# Patient Record
Sex: MALE | Race: WHITE | NOT HISPANIC OR LATINO | URBAN - NONMETROPOLITAN AREA
[De-identification: names, ages, dates, MRNs, and addresses within clinical notes are randomized per-mention and may not be internally consistent; named-entity substitution may affect disease eponyms.]

---

## 2021-09-15 ENCOUNTER — LAB OUTSIDE AN ENCOUNTER (OUTPATIENT)
Dept: URBAN - NONMETROPOLITAN AREA CLINIC 2 | Facility: CLINIC | Age: 71
End: 2021-09-15

## 2021-09-15 ENCOUNTER — WEB ENCOUNTER (OUTPATIENT)
Dept: URBAN - NONMETROPOLITAN AREA CLINIC 2 | Facility: CLINIC | Age: 71
End: 2021-09-15

## 2021-09-15 ENCOUNTER — OFFICE VISIT (OUTPATIENT)
Dept: URBAN - NONMETROPOLITAN AREA CLINIC 2 | Facility: CLINIC | Age: 71
End: 2021-09-15
Payer: MEDICARE

## 2021-09-15 VITALS
WEIGHT: 176 LBS | SYSTOLIC BLOOD PRESSURE: 105 MMHG | HEART RATE: 58 BPM | DIASTOLIC BLOOD PRESSURE: 72 MMHG | TEMPERATURE: 97.7 F | HEIGHT: 69 IN | BODY MASS INDEX: 26.07 KG/M2

## 2021-09-15 DIAGNOSIS — D50.0 IRON DEFICIENCY ANEMIA DUE TO CHRONIC BLOOD LOSS: ICD-10-CM

## 2021-09-15 DIAGNOSIS — Z12.11 COLON CANCER SCREENING: ICD-10-CM

## 2021-09-15 DIAGNOSIS — I48.91 ATRIAL FIBRILLATION, UNSPECIFIED TYPE: ICD-10-CM

## 2021-09-15 DIAGNOSIS — K62.5 RECTAL BLEEDING: ICD-10-CM

## 2021-09-15 PROBLEM — 305058001: Status: ACTIVE | Noted: 2021-09-15

## 2021-09-15 PROBLEM — 49436004: Status: ACTIVE | Noted: 2021-09-15

## 2021-09-15 PROCEDURE — 99204 OFFICE O/P NEW MOD 45 MIN: CPT | Performed by: INTERNAL MEDICINE

## 2021-09-15 RX ORDER — METOPROLOL SUCCINATE AND HYDROCHLOROTHIAZIDE 25; 12.5 MG/1; MG/1
1 TABLET TABLET ORAL ONCE A DAY
Status: ACTIVE | COMMUNITY

## 2021-09-15 RX ORDER — FINASTERIDE 5 MG/1
1 TABLET TABLET, FILM COATED ORAL ONCE A DAY
Status: ACTIVE | COMMUNITY

## 2021-09-15 RX ORDER — TAMSULOSIN HYDROCHLORIDE 0.4 MG/1
1 CAPSULE CAPSULE ORAL ONCE A DAY
Status: ACTIVE | COMMUNITY

## 2021-09-15 RX ORDER — APIXABAN 5 MG/1
AS DIRECTED TABLET, FILM COATED ORAL
Status: ACTIVE | COMMUNITY

## 2021-09-15 RX ORDER — PANTOPRAZOLE SODIUM 40 MG/1
1 TABLET TABLET, DELAYED RELEASE ORAL ONCE A DAY
Status: ACTIVE | COMMUNITY

## 2021-09-15 RX ORDER — OLMESARTAN MEDOXOMIL 40 MG/1
1 TABLET TABLET ORAL ONCE A DAY
Status: ACTIVE | COMMUNITY

## 2021-09-15 RX ORDER — LEVOTHYROXINE SODIUM 0.05 MG/1
1 TABLET IN THE MORNING ON AN EMPTY STOMACH TABLET ORAL ONCE A DAY
Status: ACTIVE | COMMUNITY

## 2021-09-15 NOTE — HPI-TODAY'S VISIT:
Mr. Levi is a 71-year-old male referred to us by Dr. Luther for consultation of GI bleeding.  A copy of this note will be sent to the referring physician.  He states over the past month he has noticed bright red blood per rectum when wiping.  He has a history of hemorrhoids.  He is on Eliquis with a history of A. fib, he is not been to a cardiologist in over 4 years.  This past week he has been having more soft sticky black stools along with bright red blood per rectum.  He has never had an EGD.  He denies any NSAID use.  His last colonoscopy was done 4 to 5 years ago in [place] by gastroenterologist there.  He states this was normal.  He did have blood work drawn Monday, we do not have a copy of these results today.  He does think that he was told he was anemic.  He denies any dizziness or shortness of breath.  Dr. Luther did start pantoprazole 40 mg daily.  He started this yesterday.  Today he is doing fairly well, he is only had 1 soft dark bowel movement today.  MB

## 2021-09-17 PROBLEM — 724556004: Status: ACTIVE | Noted: 2021-09-15

## 2021-09-17 PROBLEM — 12063002: Status: ACTIVE | Noted: 2021-09-15

## 2021-09-24 ENCOUNTER — OFFICE VISIT (OUTPATIENT)
Dept: URBAN - NONMETROPOLITAN AREA SURGERY CENTER 1 | Facility: SURGERY CENTER | Age: 71
End: 2021-09-24

## 2021-09-28 ENCOUNTER — TELEPHONE ENCOUNTER (OUTPATIENT)
Dept: URBAN - NONMETROPOLITAN AREA CLINIC 2 | Facility: CLINIC | Age: 71
End: 2021-09-28

## 2021-09-28 ENCOUNTER — OFFICE VISIT (OUTPATIENT)
Dept: URBAN - METROPOLITAN AREA MEDICAL CENTER 1 | Facility: MEDICAL CENTER | Age: 71
End: 2021-09-28
Payer: MEDICARE

## 2021-09-28 DIAGNOSIS — K31.89 ACQUIRED DEFORMITY OF DUODENUM: ICD-10-CM

## 2021-09-28 DIAGNOSIS — D12.0 ADENOMA OF CECUM: ICD-10-CM

## 2021-09-28 DIAGNOSIS — K92.1 ACUTE MELENA: ICD-10-CM

## 2021-09-28 DIAGNOSIS — D12.2 ADENOMA OF ASCENDING COLON: ICD-10-CM

## 2021-09-28 DIAGNOSIS — K31.7 BENIGN GASTRIC POLYP: ICD-10-CM

## 2021-09-28 DIAGNOSIS — K20.80 ESOPHAGITIS DISSECANS SUPERFICIALIS: ICD-10-CM

## 2021-09-28 DIAGNOSIS — D12.3 ADENOMA OF TRANSVERSE COLON: ICD-10-CM

## 2021-09-28 DIAGNOSIS — D12.8 ADENOMATOUS POLYP OF RECTUM: ICD-10-CM

## 2021-09-28 DIAGNOSIS — K29.60 ADENOPAPILLOMATOSIS GASTRICA: ICD-10-CM

## 2021-09-28 PROCEDURE — 43239 EGD BIOPSY SINGLE/MULTIPLE: CPT | Performed by: INTERNAL MEDICINE

## 2021-09-28 PROCEDURE — 45380 COLONOSCOPY AND BIOPSY: CPT | Performed by: INTERNAL MEDICINE

## 2021-09-28 PROCEDURE — 45385 COLONOSCOPY W/LESION REMOVAL: CPT | Performed by: INTERNAL MEDICINE

## 2021-09-28 RX ORDER — APIXABAN 5 MG/1
AS DIRECTED TABLET, FILM COATED ORAL
Status: ACTIVE | COMMUNITY

## 2021-09-28 RX ORDER — PANTOPRAZOLE SODIUM 40 MG/1
1 TABLET TABLET, DELAYED RELEASE ORAL ONCE A DAY
Status: ACTIVE | COMMUNITY

## 2021-09-28 RX ORDER — FINASTERIDE 5 MG/1
1 TABLET TABLET, FILM COATED ORAL ONCE A DAY
Status: ACTIVE | COMMUNITY

## 2021-09-28 RX ORDER — TAMSULOSIN HYDROCHLORIDE 0.4 MG/1
1 CAPSULE CAPSULE ORAL ONCE A DAY
Status: ACTIVE | COMMUNITY

## 2021-09-28 RX ORDER — OLMESARTAN MEDOXOMIL 40 MG/1
1 TABLET TABLET ORAL ONCE A DAY
Status: ACTIVE | COMMUNITY

## 2021-09-28 RX ORDER — METOPROLOL SUCCINATE AND HYDROCHLOROTHIAZIDE 25; 12.5 MG/1; MG/1
1 TABLET TABLET ORAL ONCE A DAY
Status: ACTIVE | COMMUNITY

## 2021-09-28 RX ORDER — LEVOTHYROXINE SODIUM 0.05 MG/1
1 TABLET IN THE MORNING ON AN EMPTY STOMACH TABLET ORAL ONCE A DAY
Status: ACTIVE | COMMUNITY

## 2021-11-09 ENCOUNTER — OFFICE VISIT (OUTPATIENT)
Dept: URBAN - NONMETROPOLITAN AREA CLINIC 2 | Facility: CLINIC | Age: 71
End: 2021-11-09

## 2021-12-03 ENCOUNTER — OFFICE VISIT (OUTPATIENT)
Dept: URBAN - NONMETROPOLITAN AREA CLINIC 2 | Facility: CLINIC | Age: 71
End: 2021-12-03
Payer: MEDICARE

## 2021-12-03 VITALS
SYSTOLIC BLOOD PRESSURE: 106 MMHG | HEART RATE: 51 BPM | WEIGHT: 174 LBS | HEIGHT: 69 IN | TEMPERATURE: 97 F | DIASTOLIC BLOOD PRESSURE: 74 MMHG | BODY MASS INDEX: 25.77 KG/M2

## 2021-12-03 DIAGNOSIS — K31.A0 INTESTINAL METAPLASIA OF STOMACH: ICD-10-CM

## 2021-12-03 DIAGNOSIS — Z79.02 LONG TERM (CURRENT) USE OF ANTITHROMBOTICS/ANTIPLATELETS: ICD-10-CM

## 2021-12-03 DIAGNOSIS — Z86.010 PERSONAL HISTORY OF COLONIC POLYPS: ICD-10-CM

## 2021-12-03 DIAGNOSIS — K20.90 ESOPHAGITIS: ICD-10-CM

## 2021-12-03 DIAGNOSIS — D50.8 ACQUIRED IRON DEFICIENCY ANEMIA DUE TO DECREASED ABSORPTION: ICD-10-CM

## 2021-12-03 PROCEDURE — 99214 OFFICE O/P EST MOD 30 MIN: CPT | Performed by: INTERNAL MEDICINE

## 2021-12-03 RX ORDER — TAMSULOSIN HYDROCHLORIDE 0.4 MG/1
1 CAPSULE CAPSULE ORAL ONCE A DAY
Status: ACTIVE | COMMUNITY

## 2021-12-03 RX ORDER — OLMESARTAN MEDOXOMIL 40 MG/1
1 TABLET TABLET ORAL ONCE A DAY
Status: ACTIVE | COMMUNITY

## 2021-12-03 RX ORDER — PANTOPRAZOLE SODIUM 20 MG/1
1 TABLET TABLET, DELAYED RELEASE ORAL ONCE A DAY
Qty: 90 | Refills: 3 | OUTPATIENT
Start: 2021-12-03

## 2021-12-03 RX ORDER — METOPROLOL SUCCINATE AND HYDROCHLOROTHIAZIDE 25; 12.5 MG/1; MG/1
1 TABLET TABLET ORAL ONCE A DAY
Status: ACTIVE | COMMUNITY

## 2021-12-03 RX ORDER — APIXABAN 5 MG/1
AS DIRECTED TABLET, FILM COATED ORAL
Status: ACTIVE | COMMUNITY

## 2021-12-03 RX ORDER — FINASTERIDE 5 MG/1
1 TABLET TABLET, FILM COATED ORAL ONCE A DAY
Status: ACTIVE | COMMUNITY

## 2021-12-03 RX ORDER — POLYETHYLENE GLYCOL 3350, SODIUM SULFATE, SODIUM CHLORIDE, POTASSIUM CHLORIDE, ASCORBIC ACID, SODIUM ASCORBATE 140-9-5.2G
AS DIRECTED KIT ORAL AS DIRECTED
Qty: 280 GRAM | Refills: 0 | OUTPATIENT
Start: 2021-12-03 | End: 2021-12-04

## 2021-12-03 RX ORDER — LEVOTHYROXINE SODIUM 0.05 MG/1
1 TABLET IN THE MORNING ON AN EMPTY STOMACH TABLET ORAL ONCE A DAY
Status: ACTIVE | COMMUNITY

## 2021-12-03 NOTE — HPI-TODAY'S VISIT:
9/15/2021: Initial Gastroenterology Clinic Visit   Mr. Levi is a 71-year-old male referred to us by Dr. Luther for consultation of GI bleeding.  A copy of this note will be sent to the referring physician.  He states over the past month he has noticed bright red blood per rectum when wiping.  He has a history of hemorrhoids.  He is on Eliquis with a history of A. fib, he is not been to a cardiologist in over 4 years.  This past week he has been having more soft sticky black stools along with bright red blood per rectum.  He has never had an EGD.  He denies any NSAID use.  His last colonoscopy was done 4 to 5 years ago in [place] by gastroenterologist there.  He states this was normal.  He did have blood work drawn Monday, we do not have a copy of these results today.  He does think that he was told he was anemic.  He denies any dizziness or shortness of breath.  Dr. Luther did start pantoprazole 40 mg daily.  He started this yesterday.  Today he is doing fairly well, he is only had 1 soft dark bowel movement today.  MB  9/28/2021: EGD Findings: - LA Grade A (one or more mucosal breaks less than 5 mm, not extending between tops of 2 mucosal folds) esophagitis with no bleeding was found. Biopsies were taken with a cold forceps for histology. Estimated blood loss was minimal. - The Z-line was regular and was found 35 cm from the incisors. - Erythematous mucosa without bleeding was found in the gastric body and in the gastric antrum. Biopsies were taken with a cold forceps for Helicobacter pylori testing. Estimated blood loss was minimal. - A single non-bleeding erosion was found in the pyloric channel. There were no stigmata of recent bleeding. Biopsies were taken with a cold forceps for histology. Estimated blood loss was minimal. - A few 2 to 4 mm sessile polyps with no stigmata of recent bleeding were found in the gastric body. Biopsies were taken with a cold forceps for histology of the largest polyp for sampling. Estimated blood loss was minimal. - The cardia and gastric fundus were normal on retroflexion. - The examined duodenum was normal. Biopsies for histology were taken with a cold forceps for evaluation of celiac disease. Estimated blood loss was minimal. 9/28/2021: Pathology from EGD A.   DUODENUM, BIOPSY:              ESSENTIALLY NORMAL DUODENAL VILLOUS ARCHITECTURE.              MILD CHRONIC NONSPECIFIC INFLAMMATION.                  NO CARCINOMA SEEN. B.  STOMACH, BIOPSY:              MILD TO MODERATE CHRONIC GASTRITIS AND PARTIAL GOBLET CELL METAPLASIA WITHOUT DYSPLASIA.              Helicobacter pylori stain negative.               NO CARCINOMA SEEN.   C.  STOMACH, DESIGNATED WHOLE BIOPSY:         CONSISTENT WITH HYPERPLASTIC GASTRIC POLYP.         NO CARCINOMA. D.  ESOPHAGUS BIOPSY:         MILD ACUTE AND CHRONIC ESOPHAGITIS.         NO INTESTINALIZATION, DYSPLASIA, OR CARCINOMA. 9/28/2021: Colonoscopy Findings: - The perianal and digital rectal examinations were normal. - The terminal ileum appeared normal. - Two sessile polyps were found in the cecum. The polyps were 2 to 3 mm in size. These polyps were removed with a cold biopsy forceps. Resection and retrieval were complete. Estimated blood loss was minimal. - Retroflexion in the right colon was performed and was normal. - Six sessile polyps were found in the ascending colon. The polyps were 3 to 6 mm in size. These polyps were removed with a cold snare. Resection and retrieval were complete. Estimated blood loss was minimal. - A 4 mm polyp was found in the transverse colon. The polyp was sessile. The polyp was removed with a cold snare. Resection and retrieval were complete. Estimated blood loss was minimal. - A few small and large-mouthed diverticula were found in the sigmoid colon. - A 3 mm polyp was found in the rectum. The polyp was sessile. The polyp was removed with a cold snare. Resection and retrieval were complete. Estimated blood loss was minimal. - A 12 mm polyp was found in the rectum. The polyp was pedunculated. The polyp was removed with a cold snare. Resection and retrieval were complete. Estimated blood loss was minimal. - Hemorrhoids were found during retroflexion. 9/28/2021: Pathology from Colonoscopy E.  COLON,   ASCENDING, BIOPSY:              TUBULAR ADENOMA (ADENOMATOUS POLYP).           NO HIGH GRADE DYSPLASIA OR CARCINOMA SEEN. F.  COLON,   CECUM, BIOPSY:              TUBULAR ADENOMA (ADENOMATOUS POLYP).           NO HIGH GRADE DYSPLASIA OR CARCINOMA SEEN. G.  COLON,   TRANSVERSE, BIOPSY:              TUBULAR ADENOMA (ADENOMATOUS POLYP).           NO HIGH GRADE DYSPLASIA OR CARCINOMA SEEN. H.  COLON,   RECTUM, BIOPSY:              TUBULAR ADENOMA (ADENOMATOUS POLYP).           NO HIGH GRADE DYSPLASIA OR CARCINOMA SEEN.  12/3/2021: Gastroenterology Follow-Up Visit  Mr. Levi has had a rare episode of bright red blood per rectum. He is not taking NSAIDs. He has not had melena or hematochezia. He is on apixaban. He is not taking his proton pump inhibitor. He is being evaluated by Cardiology for pacemaker placement. Denies abdominal pain or weight loss.

## 2021-12-05 LAB
A/G RATIO: 2.4
ALBUMIN: 4.5
ALKALINE PHOSPHATASE: 57
ALT (SGPT): 13
AST (SGOT): 18
BASO (ABSOLUTE): 0
BASOS: 1
BILIRUBIN, TOTAL: 0.4
BUN/CREATININE RATIO: 21
BUN: 21
CALCIUM: 9.5
CARBON DIOXIDE, TOTAL: 25
CHLORIDE: 101
CREATININE: 1
EGFR IF AFRICN AM: 87
EGFR IF NONAFRICN AM: 75
EOS (ABSOLUTE): 0.1
EOS: 3
GLOBULIN, TOTAL: 1.9
GLUCOSE: 90
H PYLORI, IGM ABS: <9
H. PYLORI, IGA ABS: <9
H. PYLORI, IGG ABS: 0.49
HEMATOCRIT: 41.4
HEMATOLOGY COMMENTS:: (no result)
HEMOGLOBIN: 13.2
IMMATURE CELLS: (no result)
IMMATURE GRANS (ABS): 0
IMMATURE GRANULOCYTES: 0
LYMPHS (ABSOLUTE): 1.3
LYMPHS: 32
MCH: 30.7
MCHC: 31.9
MCV: 96
MONOCYTES(ABSOLUTE): 0.4
MONOCYTES: 11
NEUTROPHILS (ABSOLUTE): 2.2
NEUTROPHILS: 53
NRBC: (no result)
PLATELETS: 195
POTASSIUM: 4.6
PROTEIN, TOTAL: 6.4
RBC: 4.3
RDW: 12
SODIUM: 140
WBC: 4

## 2022-07-11 ENCOUNTER — TELEPHONE ENCOUNTER (OUTPATIENT)
Dept: URBAN - NONMETROPOLITAN AREA CLINIC 2 | Facility: CLINIC | Age: 72
End: 2022-07-11

## 2022-08-23 ENCOUNTER — LAB OUTSIDE AN ENCOUNTER (OUTPATIENT)
Dept: URBAN - NONMETROPOLITAN AREA CLINIC 2 | Facility: CLINIC | Age: 72
End: 2022-08-23

## 2022-08-23 ENCOUNTER — OFFICE VISIT (OUTPATIENT)
Dept: URBAN - METROPOLITAN AREA MEDICAL CENTER 1 | Facility: MEDICAL CENTER | Age: 72
End: 2022-08-23
Payer: MEDICARE

## 2022-08-23 DIAGNOSIS — D12.2 ADENOMA OF ASCENDING COLON: ICD-10-CM

## 2022-08-23 DIAGNOSIS — D12.8 ADENOMATOUS POLYP OF RECTUM: ICD-10-CM

## 2022-08-23 DIAGNOSIS — D12.3 ADENOMA OF TRANSVERSE COLON: ICD-10-CM

## 2022-08-23 DIAGNOSIS — K29.30 CHRONIC SUPERFICIAL GASTRITIS: ICD-10-CM

## 2022-08-23 DIAGNOSIS — Z86.010 ADENOMAS PERSONAL HISTORY OF COLONIC POLYPS: ICD-10-CM

## 2022-08-23 DIAGNOSIS — K20.80 ESOPHAGITIS DISSECANS SUPERFICIALIS: ICD-10-CM

## 2022-08-23 PROCEDURE — 45385 COLONOSCOPY W/LESION REMOVAL: CPT | Performed by: INTERNAL MEDICINE

## 2022-08-23 PROCEDURE — 43239 EGD BIOPSY SINGLE/MULTIPLE: CPT | Performed by: INTERNAL MEDICINE

## 2022-08-23 RX ORDER — APIXABAN 5 MG/1
AS DIRECTED TABLET, FILM COATED ORAL
Status: ACTIVE | COMMUNITY

## 2022-08-23 RX ORDER — OLMESARTAN MEDOXOMIL 40 MG/1
1 TABLET TABLET ORAL ONCE A DAY
Status: ACTIVE | COMMUNITY

## 2022-08-23 RX ORDER — PANTOPRAZOLE SODIUM 20 MG/1
1 TABLET TABLET, DELAYED RELEASE ORAL ONCE A DAY
Qty: 90 | Refills: 3 | Status: ACTIVE | COMMUNITY
Start: 2021-12-03

## 2022-08-23 RX ORDER — FINASTERIDE 5 MG/1
1 TABLET TABLET, FILM COATED ORAL ONCE A DAY
Status: ACTIVE | COMMUNITY

## 2022-08-23 RX ORDER — METOPROLOL SUCCINATE AND HYDROCHLOROTHIAZIDE 25; 12.5 MG/1; MG/1
1 TABLET TABLET ORAL ONCE A DAY
Status: ACTIVE | COMMUNITY

## 2022-08-23 RX ORDER — TAMSULOSIN HYDROCHLORIDE 0.4 MG/1
1 CAPSULE CAPSULE ORAL ONCE A DAY
Status: ACTIVE | COMMUNITY

## 2022-08-23 RX ORDER — LEVOTHYROXINE SODIUM 0.05 MG/1
1 TABLET IN THE MORNING ON AN EMPTY STOMACH TABLET ORAL ONCE A DAY
Status: ACTIVE | COMMUNITY

## 2022-09-12 ENCOUNTER — OFFICE VISIT (OUTPATIENT)
Dept: URBAN - NONMETROPOLITAN AREA CLINIC 2 | Facility: CLINIC | Age: 72
End: 2022-09-12
Payer: MEDICARE

## 2022-09-12 VITALS
TEMPERATURE: 98.4 F | HEIGHT: 69 IN | DIASTOLIC BLOOD PRESSURE: 90 MMHG | HEART RATE: 62 BPM | SYSTOLIC BLOOD PRESSURE: 140 MMHG | BODY MASS INDEX: 25.77 KG/M2 | WEIGHT: 174 LBS

## 2022-09-12 DIAGNOSIS — K31.A0 INTESTINAL METAPLASIA OF STOMACH: ICD-10-CM

## 2022-09-12 DIAGNOSIS — K20.90 ESOPHAGITIS: ICD-10-CM

## 2022-09-12 DIAGNOSIS — Z86.010 PERSONAL HISTORY OF COLONIC POLYPS: ICD-10-CM

## 2022-09-12 DIAGNOSIS — Z79.02 LONG TERM (CURRENT) USE OF ANTITHROMBOTICS/ANTIPLATELETS: ICD-10-CM

## 2022-09-12 PROCEDURE — 99214 OFFICE O/P EST MOD 30 MIN: CPT | Performed by: INTERNAL MEDICINE

## 2022-09-12 RX ORDER — LEVOTHYROXINE SODIUM 0.05 MG/1
1 TABLET IN THE MORNING ON AN EMPTY STOMACH TABLET ORAL ONCE A DAY
Status: ACTIVE | COMMUNITY

## 2022-09-12 RX ORDER — FINASTERIDE 5 MG/1
1 TABLET TABLET, FILM COATED ORAL ONCE A DAY
Status: ACTIVE | COMMUNITY

## 2022-09-12 RX ORDER — TAMSULOSIN HYDROCHLORIDE 0.4 MG/1
1 CAPSULE CAPSULE ORAL ONCE A DAY
Status: ACTIVE | COMMUNITY

## 2022-09-12 RX ORDER — FLECAINIDE ACETATE 100 MG/1
AS DIRECTED TABLET ORAL
Status: ACTIVE | COMMUNITY

## 2022-09-12 RX ORDER — APIXABAN 5 MG/1
AS DIRECTED TABLET, FILM COATED ORAL
Status: ACTIVE | COMMUNITY

## 2022-09-12 RX ORDER — TELMISARTAN 80 MG/1
1 TABLET TABLET ORAL ONCE A DAY
Status: ACTIVE | COMMUNITY

## 2022-09-12 RX ORDER — PANTOPRAZOLE SODIUM 20 MG/1
1 TABLET TABLET, DELAYED RELEASE ORAL ONCE A DAY
Qty: 90 | Refills: 3 | OUTPATIENT

## 2022-09-12 RX ORDER — METOPROLOL SUCCINATE AND HYDROCHLOROTHIAZIDE 25; 12.5 MG/1; MG/1
1 TABLET TABLET ORAL ONCE A DAY
Status: ACTIVE | COMMUNITY

## 2022-09-12 RX ORDER — PANTOPRAZOLE SODIUM 20 MG/1
1 TABLET TABLET, DELAYED RELEASE ORAL ONCE A DAY
Qty: 90 | Refills: 3 | Status: ACTIVE | COMMUNITY
Start: 2021-12-03

## 2022-09-12 RX ORDER — EZETIMIBE AND SIMVASTATIN 10; 20 MG/1; MG/1
1 TABLET TABLET ORAL ONCE A DAY
Status: ACTIVE | COMMUNITY

## 2022-09-12 NOTE — HPI-TODAY'S VISIT:
9/15/2021: Initial Gastroenterology Clinic Visit    Mr. Levi is a 71-year-old male referred to us by Dr. Luther for consultation of GI bleeding.  A copy of this note will be sent to the referring physician.  He states over the past month he has noticed bright red blood per rectum when wiping.  He has a history of hemorrhoids.  He is on Eliquis with a history of A. fib, he is not been to a cardiologist in over 4 years.  This past week he has been having more soft sticky black stools along with bright red blood per rectum.  He has never had an EGD.  He denies any NSAID use.  His last colonoscopy was done 4 to 5 years ago in [place] by gastroenterologist there.  He states this was normal.  He did have blood work drawn Monday, we do not have a copy of these results today.  He does think that he was told he was anemic.  He denies any dizziness or shortness of breath.  Dr. Luther did start pantoprazole 40 mg daily.  He started this yesterday.  Today he is doing fairly well, he is only had 1 soft dark bowel movement today.  MB  9/28/2021: EGD Findings: - LA Grade A (one or more mucosal breaks less than 5 mm, not extending between tops of 2 mucosal folds) esophagitis with no bleeding was found. Biopsies were taken with a cold forceps for histology. Estimated blood loss was minimal. - The Z-line was regular and was found 35 cm from the incisors. - Erythematous mucosa without bleeding was found in the gastric body and in the gastric antrum. Biopsies were taken with a cold forceps for Helicobacter pylori testing. Estimated blood loss was minimal. - A single non-bleeding erosion was found in the pyloric channel. There were no stigmata of recent bleeding. Biopsies were taken with a cold forceps for histology. Estimated blood loss was minimal. - A few 2 to 4 mm sessile polyps with no stigmata of recent bleeding were found in the gastric body. Biopsies were taken with a cold forceps for histology of the largest polyp for sampling. Estimated blood loss was minimal. - The cardia and gastric fundus were normal on retroflexion. - The examined duodenum was normal. Biopsies for histology were taken with a cold forceps for evaluation of celiac disease. Estimated blood loss was minimal. 9/28/2021: Pathology from EGD A.   DUODENUM, BIOPSY:              ESSENTIALLY NORMAL DUODENAL VILLOUS ARCHITECTURE.              MILD CHRONIC NONSPECIFIC INFLAMMATION.                  NO CARCINOMA SEEN. B.  STOMACH, BIOPSY:              MILD TO MODERATE CHRONIC GASTRITIS AND PARTIAL GOBLET CELL METAPLASIA WITHOUT DYSPLASIA.              Helicobacter pylori stain negative.               NO CARCINOMA SEEN.   C.  STOMACH, DESIGNATED WHOLE BIOPSY:         CONSISTENT WITH HYPERPLASTIC GASTRIC POLYP.         NO CARCINOMA. D.  ESOPHAGUS BIOPSY:         MILD ACUTE AND CHRONIC ESOPHAGITIS.         NO INTESTINALIZATION, DYSPLASIA, OR CARCINOMA. 9/28/2021: Colonoscopy Findings: - The perianal and digital rectal examinations were normal. - The terminal ileum appeared normal. - Two sessile polyps were found in the cecum. The polyps were 2 to 3 mm in size. These polyps were removed with a cold biopsy forceps. Resection and retrieval were complete. Estimated blood loss was minimal. - Retroflexion in the right colon was performed and was normal. - Six sessile polyps were found in the ascending colon. The polyps were 3 to 6 mm in size. These polyps were removed with a cold snare. Resection and retrieval were complete. Estimated blood loss was minimal. - A 4 mm polyp was found in the transverse colon. The polyp was sessile. The polyp was removed with a cold snare. Resection and retrieval were complete. Estimated blood loss was minimal. - A few small and large-mouthed diverticula were found in the sigmoid colon. - A 3 mm polyp was found in the rectum. The polyp was sessile. The polyp was removed with a cold snare. Resection and retrieval were complete. Estimated blood loss was minimal. - A 12 mm polyp was found in the rectum. The polyp was pedunculated. The polyp was removed with a cold snare. Resection and retrieval were complete. Estimated blood loss was minimal. - Hemorrhoids were found during retroflexion. 9/28/2021: Pathology from Colonoscopy E.  COLON,   ASCENDING, BIOPSY:              TUBULAR ADENOMA (ADENOMATOUS POLYP).           NO HIGH GRADE DYSPLASIA OR CARCINOMA SEEN. F.  COLON,   CECUM, BIOPSY:              TUBULAR ADENOMA (ADENOMATOUS POLYP).           NO HIGH GRADE DYSPLASIA OR CARCINOMA SEEN. G.  COLON,   TRANSVERSE, BIOPSY:              TUBULAR ADENOMA (ADENOMATOUS POLYP).           NO HIGH GRADE DYSPLASIA OR CARCINOMA SEEN. H.  COLON,   RECTUM, BIOPSY:              TUBULAR ADENOMA (ADENOMATOUS POLYP).           NO HIGH GRADE DYSPLASIA OR CARCINOMA SEEN.  12/3/2021: Gastroenterology Follow-Up Visit  Mr. Levi has had a rare episode of bright red blood per rectum. He is not taking NSAIDs. He has not had melena or hematochezia. He is on apixaban. He is not taking his proton pump inhibitor. He is being evaluated by Cardiology for pacemaker placement. Denies abdominal pain or weight loss.  12/3/2021: CBC normal, chemistry panel normal, LFTs normal. Helicobacter pylori serologies without evidence of Helicobacter pylori.  12/2021: Underwent cardiac pacemaker placement due to paroxysmal atrial fibrillation with tachybrady syndrome.  8/23/2022: EGD - LA Grade A (one or more mucosal breaks less than 5 mm, not extending between tops of 2 mucosal folds) esophagitis was found at the distal esophagus. - The Z-line was regular and was found 35 cm from the incisors. - Erythematous mucosa without bleeding was found in the entire examined stomach. Biopsies were taken with a cold forceps of the antrum, body, incisura, and fundus for histology given history of intestinal metaplasia. Estimated blood loss was minimal. - The cardia and gastric fundus were normal on retroflexion. - The examined duodenum was normal. 8/23/2022: Pathology from EGD A.  STOMACH, ANTRUM (BIOPSY):  FOCAL MILD CHRONIC GASTRITIS, NONSPECIFIC, NOT OTHERWISE REMARKABLE (NEGATIVE FOR ULCERATION, ACUTE INFLAMMATION, AND EPITHELIAL ATYPIA). B.  STOMACH, WHOLE (BIOPSY):  FOCAL MILD CHRONIC GASTRITIS, NONSPECIFIC, NOT OTHERWISE REMARKABLE (NEGATIVE FOR ULCERATION, ACUTE INFLAMMATION, AND EPITHELIAL ATYPIA). C.  STOMACH, FUNDUS (BIOPSY):  FOCAL MILD CHRONIC GASTRITIS, NONSPECIFIC, NOT OTHERWISE REMARKABLE (NEGATIVE FOR ULCERATION, ACUTE INFLAMMATION, AND EPITHELIAL ATYPIA). D.  STOMACH, BODY (BIOPSY):  FOCAL MILD CHRONIC GASTRITIS, NONSPECIFIC, NOT OTHERWISE REMARKABLE (NEGATIVE FOR ULCERATION, ACUTE INFLAMMATION, AND EPITHELIAL ATYPIA). 8/23/2022: Colonoscopy  - Hemorrhoids were found on perianal exam. - The terminal ileum appeared normal. - Two sessile polyps were found in the cecum. The polyps were 3 to 5 mm in size. These polyps were removed with a cold snare. Resection and retrieval were complete. - Retroflexion in the right colon was performed. - Five sessile polyps were found in the ascending colon. The polyps were 4 to 8 mm in size. These polyps were removed with a cold snare. Resection and retrieval were complete. Estimated blood loss was minimal. - Four sessile polyps were found in the transverse colon. The polyps were 4 to 6 mm in size. These polyps were removed with a cold snare. Resection and retrieval were complete. Estimated blood loss was minimal. - Multiple small and large-mouthed diverticula were found in the sigmoid colon. - A 4 mm polyp was found in the rectum. The polyp was sessile. The polyp was removed with a cold snare. Resection and retrieval were complete. Estimated blood loss was minimal. - Hemorrhoids were found during retroflexion. 8/23/2022: Pathology from Colonoscopy  E.  ASCENDING COLON (BIOPSY): (FRAGMENTS OF) COLONIC MUCOSA AND SERRATED ADENOMA, NEGATIVE FOR HIGH-GRADE DYSPLASIA. F.  CECUM (BIOPSY):  MUCOSAL FOLD (POLYPOID MUCOSAL NODULE), NEGATIVE FOR HYPERPLASTIC AND ADENOMATOUS POLYPOID CHANGES G.  TRANSVERSE COLON (BIOPSY):  (FRAGMENTS OF) COLONIC MUCOSA AND TUBULAR ADENOMA, NEGATIVE FOR HIGH-GRADE DYSPLASIA. H.  COLON, RECTUM (BIOPSY):  TUBULAR ADENOMA, NEGATIVE FOR HIGH-GRADE DYSPLASIA.  9/12/2022: Gastroenterology Follow-Up Visit Denies abdominal pain, melena, hematochezia, dysphagia, odynophagia, unintentional weight loss. Not currently on NSAIDs. Not currently on PPI for his history of LA Grade A esophagitis.

## 2022-09-24 PROBLEM — 305058001: Status: ACTIVE | Noted: 2021-12-03

## 2022-09-24 PROBLEM — 72519002: Status: ACTIVE | Noted: 2021-12-03

## 2023-03-13 ENCOUNTER — LAB OUTSIDE AN ENCOUNTER (OUTPATIENT)
Dept: URBAN - NONMETROPOLITAN AREA CLINIC 13 | Facility: CLINIC | Age: 73
End: 2023-03-13

## 2023-03-13 ENCOUNTER — DASHBOARD ENCOUNTERS (OUTPATIENT)
Age: 73
End: 2023-03-13

## 2023-03-13 ENCOUNTER — OFFICE VISIT (OUTPATIENT)
Dept: URBAN - NONMETROPOLITAN AREA CLINIC 13 | Facility: CLINIC | Age: 73
End: 2023-03-13
Payer: MEDICARE

## 2023-03-13 VITALS
WEIGHT: 171 LBS | HEART RATE: 76 BPM | HEIGHT: 69 IN | BODY MASS INDEX: 25.33 KG/M2 | SYSTOLIC BLOOD PRESSURE: 95 MMHG | DIASTOLIC BLOOD PRESSURE: 64 MMHG | TEMPERATURE: 98 F

## 2023-03-13 DIAGNOSIS — Z79.02 LONG TERM (CURRENT) USE OF ANTITHROMBOTICS/ANTIPLATELETS: ICD-10-CM

## 2023-03-13 DIAGNOSIS — K31.A0 INTESTINAL METAPLASIA OF STOMACH: ICD-10-CM

## 2023-03-13 DIAGNOSIS — Z86.010 PERSONAL HISTORY OF COLONIC POLYPS: ICD-10-CM

## 2023-03-13 DIAGNOSIS — K20.80 OTHER ESOPHAGITIS: ICD-10-CM

## 2023-03-13 PROBLEM — 428283002: Status: ACTIVE | Noted: 2021-12-03

## 2023-03-13 PROBLEM — 16761005: Status: ACTIVE | Noted: 2021-12-03

## 2023-03-13 PROCEDURE — 99214 OFFICE O/P EST MOD 30 MIN: CPT | Performed by: NURSE PRACTITIONER

## 2023-03-13 RX ORDER — TAMSULOSIN HYDROCHLORIDE 0.4 MG/1
1 CAPSULE CAPSULE ORAL ONCE A DAY
Status: ACTIVE | COMMUNITY

## 2023-03-13 RX ORDER — FAMOTIDINE 20 MG/1
1 TABLET TABLET, FILM COATED ORAL ONCE A DAY
Qty: 30 TABLET | Refills: 11 | OUTPATIENT
Start: 2023-03-13

## 2023-03-13 RX ORDER — PANTOPRAZOLE SODIUM 20 MG/1
1 TABLET TABLET, DELAYED RELEASE ORAL ONCE A DAY
Qty: 90 | Refills: 3 | Status: ACTIVE | COMMUNITY

## 2023-03-13 RX ORDER — FINASTERIDE 5 MG/1
1 TABLET TABLET, FILM COATED ORAL ONCE A DAY
Status: ACTIVE | COMMUNITY

## 2023-03-13 RX ORDER — FLECAINIDE ACETATE 100 MG/1
AS DIRECTED TABLET ORAL
Status: ACTIVE | COMMUNITY

## 2023-03-13 RX ORDER — EZETIMIBE AND SIMVASTATIN 10; 20 MG/1; MG/1
1 TABLET TABLET ORAL ONCE A DAY
Status: ACTIVE | COMMUNITY

## 2023-03-13 RX ORDER — METOPROLOL SUCCINATE AND HYDROCHLOROTHIAZIDE 25; 12.5 MG/1; MG/1
1 TABLET TABLET ORAL ONCE A DAY
Status: ACTIVE | COMMUNITY

## 2023-03-13 RX ORDER — LEVOTHYROXINE SODIUM 0.05 MG/1
1 TABLET IN THE MORNING ON AN EMPTY STOMACH TABLET ORAL ONCE A DAY
Status: ACTIVE | COMMUNITY

## 2023-03-13 RX ORDER — TELMISARTAN 80 MG/1
1 TABLET TABLET ORAL ONCE A DAY
Status: ACTIVE | COMMUNITY

## 2023-03-13 RX ORDER — APIXABAN 5 MG/1
AS DIRECTED TABLET, FILM COATED ORAL
Status: ACTIVE | COMMUNITY

## 2023-03-13 NOTE — HPI-TODAY'S VISIT:
3/13/2023 Mr. Boyd Levi is a 72 year old male here for f/u. He was last seen in September 2022 by Dr Mann. He is feeling well and has no GI symptoms. He is no longer on protonix. he denies any reflux at this time. His last colonoscopy he had multiple polyps. He is due for repeat later this year. HIs bowels are normal. CS

## 2023-03-13 NOTE — HPI-OTHER HISTORIES
9/15/2021: Initial Gastroenterology Clinic Visit    Mr. Levi is a 71-year-old male referred to us by Dr. Luther for consultation of GI bleeding.  A copy of this note will be sent to the referring physician.  He states over the past month he has noticed bright red blood per rectum when wiping.  He has a history of hemorrhoids.  He is on Eliquis with a history of A. fib, he is not been to a cardiologist in over 4 years.  This past week he has been having more soft sticky black stools along with bright red blood per rectum.  He has never had an EGD.  He denies any NSAID use.  His last colonoscopy was done 4 to 5 years ago in [place] by gastroenterologist there.  He states this was normal.  He did have blood work drawn Monday, we do not have a copy of these results today.  He does think that he was told he was anemic.  He denies any dizziness or shortness of breath.  Dr. Luther did start pantoprazole 40 mg daily.  He started this yesterday.  Today he is doing fairly well, he is only had 1 soft dark bowel movement today.  MB  9/28/2021: EGD Findings: - LA Grade A (one or more mucosal breaks less than 5 mm, not extending between tops of 2 mucosal folds) esophagitis with no bleeding was found. Biopsies were taken with a cold forceps for histology. Estimated blood loss was minimal. - The Z-line was regular and was found 35 cm from the incisors. - Erythematous mucosa without bleeding was found in the gastric body and in the gastric antrum. Biopsies were taken with a cold forceps for Helicobacter pylori testing. Estimated blood loss was minimal. - A single non-bleeding erosion was found in the pyloric channel. There were no stigmata of recent bleeding. Biopsies were taken with a cold forceps for histology. Estimated blood loss was minimal. - A few 2 to 4 mm sessile polyps with no stigmata of recent bleeding were found in the gastric body. Biopsies were taken with a cold forceps for histology of the largest polyp for sampling. Estimated blood loss was minimal. - The cardia and gastric fundus were normal on retroflexion. - The examined duodenum was normal. Biopsies for histology were taken with a cold forceps for evaluation of celiac disease. Estimated blood loss was minimal. 9/28/2021: Pathology from EGD A.   DUODENUM, BIOPSY:              ESSENTIALLY NORMAL DUODENAL VILLOUS ARCHITECTURE.              MILD CHRONIC NONSPECIFIC INFLAMMATION.                  NO CARCINOMA SEEN. B.  STOMACH, BIOPSY:              MILD TO MODERATE CHRONIC GASTRITIS AND PARTIAL GOBLET CELL METAPLASIA WITHOUT DYSPLASIA.              Helicobacter pylori stain negative.               NO CARCINOMA SEEN.   C.  STOMACH, DESIGNATED WHOLE BIOPSY:         CONSISTENT WITH HYPERPLASTIC GASTRIC POLYP.         NO CARCINOMA. D.  ESOPHAGUS BIOPSY:         MILD ACUTE AND CHRONIC ESOPHAGITIS.         NO INTESTINALIZATION, DYSPLASIA, OR CARCINOMA. 9/28/2021: Colonoscopy Findings: - The perianal and digital rectal examinations were normal. - The terminal ileum appeared normal. - Two sessile polyps were found in the cecum. The polyps were 2 to 3 mm in size. These polyps were removed with a cold biopsy forceps. Resection and retrieval were complete. Estimated blood loss was minimal. - Retroflexion in the right colon was performed and was normal. - Six sessile polyps were found in the ascending colon. The polyps were 3 to 6 mm in size. These polyps were removed with a cold snare. Resection and retrieval were complete. Estimated blood loss was minimal. - A 4 mm polyp was found in the transverse colon. The polyp was sessile. The polyp was removed with a cold snare. Resection and retrieval were complete. Estimated blood loss was minimal. - A few small and large-mouthed diverticula were found in the sigmoid colon. - A 3 mm polyp was found in the rectum. The polyp was sessile. The polyp was removed with a cold snare. Resection and retrieval were complete. Estimated blood loss was minimal. - A 12 mm polyp was found in the rectum. The polyp was pedunculated. The polyp was removed with a cold snare. Resection and retrieval were complete. Estimated blood loss was minimal. - Hemorrhoids were found during retroflexion. 9/28/2021: Pathology from Colonoscopy E.  COLON,   ASCENDING, BIOPSY:              TUBULAR ADENOMA (ADENOMATOUS POLYP).           NO HIGH GRADE DYSPLASIA OR CARCINOMA SEEN. F.  COLON,   CECUM, BIOPSY:              TUBULAR ADENOMA (ADENOMATOUS POLYP).           NO HIGH GRADE DYSPLASIA OR CARCINOMA SEEN. G.  COLON,   TRANSVERSE, BIOPSY:              TUBULAR ADENOMA (ADENOMATOUS POLYP).           NO HIGH GRADE DYSPLASIA OR CARCINOMA SEEN. H.  COLON,   RECTUM, BIOPSY:              TUBULAR ADENOMA (ADENOMATOUS POLYP).           NO HIGH GRADE DYSPLASIA OR CARCINOMA SEEN.  12/3/2021: Gastroenterology Follow-Up Visit  Mr. Levi has had a rare episode of bright red blood per rectum. He is not taking NSAIDs. He has not had melena or hematochezia. He is on apixaban. He is not taking his proton pump inhibitor. He is being evaluated by Cardiology for pacemaker placement. Denies abdominal pain or weight loss.  12/3/2021: CBC normal, chemistry panel normal, LFTs normal. Helicobacter pylori serologies without evidence of Helicobacter pylori.  12/2021: Underwent cardiac pacemaker placement due to paroxysmal atrial fibrillation with tachybrady syndrome.  8/23/2022: EGD - LA Grade A (one or more mucosal breaks less than 5 mm, not extending between tops of 2 mucosal folds) esophagitis was found at the distal esophagus. - The Z-line was regular and was found 35 cm from the incisors. - Erythematous mucosa without bleeding was found in the entire examined stomach. Biopsies were taken with a cold forceps of the antrum, body, incisura, and fundus for histology given history of intestinal metaplasia. Estimated blood loss was minimal. - The cardia and gastric fundus were normal on retroflexion. - The examined duodenum was normal. 8/23/2022: Pathology from EGD A.  STOMACH, ANTRUM (BIOPSY):  FOCAL MILD CHRONIC GASTRITIS, NONSPECIFIC, NOT OTHERWISE REMARKABLE (NEGATIVE FOR ULCERATION, ACUTE INFLAMMATION, AND EPITHELIAL ATYPIA). B.  STOMACH, WHOLE (BIOPSY):  FOCAL MILD CHRONIC GASTRITIS, NONSPECIFIC, NOT OTHERWISE REMARKABLE (NEGATIVE FOR ULCERATION, ACUTE INFLAMMATION, AND EPITHELIAL ATYPIA). C.  STOMACH, FUNDUS (BIOPSY):  FOCAL MILD CHRONIC GASTRITIS, NONSPECIFIC, NOT OTHERWISE REMARKABLE (NEGATIVE FOR ULCERATION, ACUTE INFLAMMATION, AND EPITHELIAL ATYPIA). D.  STOMACH, BODY (BIOPSY):  FOCAL MILD CHRONIC GASTRITIS, NONSPECIFIC, NOT OTHERWISE REMARKABLE (NEGATIVE FOR ULCERATION, ACUTE INFLAMMATION, AND EPITHELIAL ATYPIA). 8/23/2022: Colonoscopy  - Hemorrhoids were found on perianal exam. - The terminal ileum appeared normal. - Two sessile polyps were found in the cecum. The polyps were 3 to 5 mm in size. These polyps were removed with a cold snare. Resection and retrieval were complete. - Retroflexion in the right colon was performed. - Five sessile polyps were found in the ascending colon. The polyps were 4 to 8 mm in size. These polyps were removed with a cold snare. Resection and retrieval were complete. Estimated blood loss was minimal. - Four sessile polyps were found in the transverse colon. The polyps were 4 to 6 mm in size. These polyps were removed with a cold snare. Resection and retrieval were complete. Estimated blood loss was minimal. - Multiple small and large-mouthed diverticula were found in the sigmoid colon. - A 4 mm polyp was found in the rectum. The polyp was sessile. The polyp was removed with a cold snare. Resection and retrieval were complete. Estimated blood loss was minimal. - Hemorrhoids were found during retroflexion. 8/23/2022: Pathology from Colonoscopy  E.  ASCENDING COLON (BIOPSY): (FRAGMENTS OF) COLONIC MUCOSA AND SERRATED ADENOMA, NEGATIVE FOR HIGH-GRADE DYSPLASIA. F.  CECUM (BIOPSY):  MUCOSAL FOLD (POLYPOID MUCOSAL NODULE), NEGATIVE FOR HYPERPLASTIC AND ADENOMATOUS POLYPOID CHANGES G.  TRANSVERSE COLON (BIOPSY):  (FRAGMENTS OF) COLONIC MUCOSA AND TUBULAR ADENOMA, NEGATIVE FOR HIGH-GRADE DYSPLASIA. H.  COLON, RECTUM (BIOPSY):  TUBULAR ADENOMA, NEGATIVE FOR HIGH-GRADE DYSPLASIA.  9/12/2022: Gastroenterology Follow-Up Visit Denies abdominal pain, melena, hematochezia, dysphagia, odynophagia, unintentional weight loss. Not currently on NSAIDs. Not currently on PPI for his history of LA Grade A esophagitis.

## 2023-07-07 ENCOUNTER — TELEPHONE ENCOUNTER (OUTPATIENT)
Dept: URBAN - NONMETROPOLITAN AREA CLINIC 2 | Facility: CLINIC | Age: 73
End: 2023-07-07

## 2023-08-14 ENCOUNTER — OFFICE VISIT (OUTPATIENT)
Dept: URBAN - NONMETROPOLITAN AREA SURGERY CENTER 1 | Facility: SURGERY CENTER | Age: 73
End: 2023-08-14

## 2023-09-12 ENCOUNTER — OFFICE VISIT (OUTPATIENT)
Dept: URBAN - NONMETROPOLITAN AREA CLINIC 13 | Facility: CLINIC | Age: 73
End: 2023-09-12